# Patient Record
Sex: MALE
[De-identification: names, ages, dates, MRNs, and addresses within clinical notes are randomized per-mention and may not be internally consistent; named-entity substitution may affect disease eponyms.]

---

## 2017-09-11 ENCOUNTER — APPOINTMENT (OUTPATIENT)
Dept: ORTHOPEDIC SURGERY | Facility: CLINIC | Age: 32
End: 2017-09-11
Payer: COMMERCIAL

## 2017-09-11 VITALS
HEART RATE: 75 BPM | TEMPERATURE: 98.1 F | DIASTOLIC BLOOD PRESSURE: 76 MMHG | WEIGHT: 172 LBS | HEIGHT: 66 IN | BODY MASS INDEX: 27.64 KG/M2 | SYSTOLIC BLOOD PRESSURE: 120 MMHG

## 2017-09-11 DIAGNOSIS — M75.81 OTHER SHOULDER LESIONS, RIGHT SHOULDER: ICD-10-CM

## 2017-09-11 PROCEDURE — 99213 OFFICE O/P EST LOW 20 MIN: CPT

## 2017-09-11 PROCEDURE — 73030 X-RAY EXAM OF SHOULDER: CPT | Mod: RT

## 2017-10-09 ENCOUNTER — APPOINTMENT (OUTPATIENT)
Dept: ORTHOPEDIC SURGERY | Facility: CLINIC | Age: 32
End: 2017-10-09

## 2021-03-14 ENCOUNTER — TRANSCRIPTION ENCOUNTER (OUTPATIENT)
Age: 36
End: 2021-03-14

## 2023-05-08 ENCOUNTER — APPOINTMENT (OUTPATIENT)
Dept: ORTHOPEDIC SURGERY | Facility: CLINIC | Age: 38
End: 2023-05-08
Payer: COMMERCIAL

## 2023-05-08 VITALS — WEIGHT: 155 LBS | HEIGHT: 67 IN | BODY MASS INDEX: 24.33 KG/M2

## 2023-05-08 DIAGNOSIS — Z00.00 ENCOUNTER FOR GENERAL ADULT MEDICAL EXAMINATION W/OUT ABNORMAL FINDINGS: ICD-10-CM

## 2023-05-08 DIAGNOSIS — M75.32 CALCIFIC TENDINITIS OF LEFT SHOULDER: ICD-10-CM

## 2023-05-08 DIAGNOSIS — Z78.9 OTHER SPECIFIED HEALTH STATUS: ICD-10-CM

## 2023-05-08 PROCEDURE — 73030 X-RAY EXAM OF SHOULDER: CPT | Mod: LT

## 2023-05-08 PROCEDURE — 99203 OFFICE O/P NEW LOW 30 MIN: CPT

## 2023-05-08 NOTE — DISCUSSION/SUMMARY
[de-identified] : (Assessment and Plan)\par \par History, clinical examination and imaging were most consistent with:\par -left rotator cuff calcific tendonitis\par \par The diagnosis was explained in detail. The potential non-surgical and surgical treatments were reviewed. The relative risks and benefits of each option were considered relative to the patient’s age, activity level, medical history, symptom severity and previously attempted treatments. \par \par -Non-surgical treatment was selected. \par -Patient was provided with a referral for image guided barbotage and cortisone injection. \par -Patient will proceed with formal PT when the procedure is completed. \par -Over the counter NSAID as needed for pain. GI precautions discussed.\par -The added clinical utility of an MRI was discussed. The patient deferred further diagnostic testing at this time.\par -Follow up in 6 to 8 weeks if symptoms persist or fail to improve. Consider MRI at that time. \par \par (MDM) \par \par Problem Complexity\par -Moderate: chronic illness with exacerbation\par \par Risk\par -Low: over the counter medication\par \par -Patient has not been seen by another provider in my practice within the past 2 years who specializes in orthopedic sports medicine, shoulder or elbow surgery. \par \par The patient was advised to consult with their primary medical provider prior to initiation of any new medications to reduce the risk of adverse effects specific to their long-term home medications and medical history. The risk of gastrointestinal irritation and kidney injury specific to long-term NSAID use was discussed.   \par \par

## 2023-05-08 NOTE — IMAGING
[de-identified] : (Physical Exam: Shoulder)\par \par Laterality is left\par \par Patient is in no acute distress, alert and oriented\par Sensation is grossly intact to light touch in the hand\par Motor function is 5/5 in the hand\par Capillary refill is less than 2 seconds in the fingers\par Lymphadenopathy is not present\par Peripheral edema is not present\par \par Skin is intact \par Swelling is not present \par Atrophy is not present\par Scapular winging is not present\par Deformity of the AC joint is not present\par Deformity of the biceps is not present \par \par Bicipital groove tenderness is not present \par AC joint tenderness is not present \par Scapulothoracic tenderness is not present \par Cervical paraspinal tenderness is not present \par \par Active forward elevation is 175\par Passive forward elevation is 175\par External rotation at the side is 80\par Internal rotation behind the back is to the level of T7 \par \par Forward elevation strength is 5/5\par External rotation strength at the side is 5/5 \par Internal rotation strength at the side is 5/5 \par Deltoid strength of anterior, posterior and lateral heads is 5/5\par \par Cifuentes test is abnormal \par O’Ernie’s test is normal\par Speed’s test is normal\par Cross body adduction test is normal\par Apprehension and relocation is normal\par Sulcus sign is normal\par Belly press test is normal\par Spurling’s test is normal\par  [Right] : right shoulder [Calcific density] : Calcific density

## 2023-05-08 NOTE — HISTORY OF PRESENT ILLNESS
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\par \par Body part causing symptoms is the  LT shoulder\par Symptoms began 2-3 weeks, noted pain the day after cleaning the yard\par He reports a similar episode years ago in the opposite shoulder treated with cortisone injection, diagnosed with calcium deposit\par Location of pain is lateral\par Quality of pain is sharp\par Pain score at rest is  6\par Pain score during activity is 8-9/10  \par Radicular symptoms are not present \par Prior treatments include none \par He was seen at [facility], no imaging\par Patient's condition is not associated with worker’s compensation, no-fault or interscholastic athletics \par

## 2023-05-16 ENCOUNTER — RESULT REVIEW (OUTPATIENT)
Age: 38
End: 2023-05-16